# Patient Record
Sex: MALE | Race: WHITE | NOT HISPANIC OR LATINO | Employment: OTHER | URBAN - METROPOLITAN AREA
[De-identification: names, ages, dates, MRNs, and addresses within clinical notes are randomized per-mention and may not be internally consistent; named-entity substitution may affect disease eponyms.]

---

## 2017-07-19 NOTE — PATIENT DISCUSSION
OLD RETINAL DETACHMENT OS: STABLE. FOLLOW UP. PATIENT TO RETURN TO DR Stanislaw Douglass AS SCHEDULED FOR FURTHER EVAL AND TREATMENT.

## 2018-08-25 NOTE — PATIENT DISCUSSION
EPIRETINAL MEMBRANE OD: ERM NOT VISUALLY SIGNIFICANT TO THE PATIENT AT THIS TIME. WILL CONTINUE TO FOLLOW WITH EXAMS AND OCT'S AS SCHEDULED.
General:
OLD RD OS: LOOKS STABLE. FOLLOW UP AS SCHEDULED.
POSTERIOR VITREOUS DETACHMENT OD: NO HOLES, TEARS, OR DETACHMENTS TODAY. ADVISED PT TO CALL IF ANY FLASHES OF LIGHT, INCREASE IN FLOATERS, OR DECREASE VISION IN EITHER EYE.
English

## 2019-06-18 ENCOUNTER — CATARACT CONSULT (OUTPATIENT)
Dept: URBAN - METROPOLITAN AREA CLINIC 43 | Facility: CLINIC | Age: 84
End: 2019-06-18

## 2019-06-18 DIAGNOSIS — H18.51: ICD-10-CM

## 2019-06-18 DIAGNOSIS — H25.811: ICD-10-CM

## 2019-06-18 DIAGNOSIS — H35.373: ICD-10-CM

## 2019-06-18 DIAGNOSIS — H25.812: ICD-10-CM

## 2019-06-18 PROCEDURE — 92004 COMPRE OPH EXAM NEW PT 1/>: CPT

## 2019-06-18 PROCEDURE — 92015 DETERMINE REFRACTIVE STATE: CPT

## 2019-06-18 PROCEDURE — 92134 CPTRZ OPH DX IMG PST SGM RTA: CPT

## 2019-06-18 ASSESSMENT — VISUAL ACUITY
OS_CC: J12
OD_CC: J1
OD_BAT: <20/400
OD_SC: 20/100-1
OS_SC: 20/300
OD_RAM: 20/20
OS_CC: 20/200
OS_SC: <J12
OD_SC: <J12
OS_BAT: <20/400
OS_AM: 20/100+1
OD_CC: 20/25+2

## 2019-06-18 ASSESSMENT — TONOMETRY
OD_IOP_MMHG: 14
OS_IOP_MMHG: 14

## 2019-09-25 ENCOUNTER — FOLLOW UP (OUTPATIENT)
Dept: URBAN - METROPOLITAN AREA CLINIC 43 | Facility: CLINIC | Age: 84
End: 2019-09-25

## 2019-09-25 DIAGNOSIS — H18.51: ICD-10-CM

## 2019-09-25 DIAGNOSIS — H25.811: ICD-10-CM

## 2019-09-25 DIAGNOSIS — H35.3130: ICD-10-CM

## 2019-09-25 DIAGNOSIS — H53.2: ICD-10-CM

## 2019-09-25 DIAGNOSIS — E11.3292: ICD-10-CM

## 2019-09-25 DIAGNOSIS — Z98.890: ICD-10-CM

## 2019-09-25 DIAGNOSIS — H35.373: ICD-10-CM

## 2019-09-25 DIAGNOSIS — H25.812: ICD-10-CM

## 2019-09-25 DIAGNOSIS — H40.1130: ICD-10-CM

## 2019-09-25 PROCEDURE — 92134 CPTRZ OPH DX IMG PST SGM RTA: CPT

## 2019-09-25 PROCEDURE — 92012 INTRM OPH EXAM EST PATIENT: CPT

## 2019-09-25 ASSESSMENT — VISUAL ACUITY
OD_RAM: 20/20
OD_SC: 20/100
OS_AM: 20/60
OD_SC: J12
OD_BAT: 20/100
OS_SC: J12
OD_CC: 20/40+2
OS_SC: 20/400
OD_CC: J3
OS_BAT: 20/400

## 2019-09-25 ASSESSMENT — TONOMETRY
OD_IOP_MMHG: 14
OS_IOP_MMHG: 14

## 2019-09-26 NOTE — PATIENT DISCUSSION
POSTERIOR VITREOUS DETACHMENT OD: NO HOLES, TEARS, OR DETACHMENTS TODAY. ADVISED PT TO CALL IF ANY FLASHES OF LIGHT, INCREASE IN FLOATERS, OR DECREASE VISION IN EITHER EYE.

## 2019-10-07 ENCOUNTER — PRE-OP/H&P (OUTPATIENT)
Dept: URBAN - METROPOLITAN AREA CLINIC 39 | Facility: CLINIC | Age: 84
End: 2019-10-07

## 2019-10-07 ENCOUNTER — SURGERY/PROCEDURE (OUTPATIENT)
Dept: URBAN - METROPOLITAN AREA SURGERY 14 | Facility: SURGERY | Age: 84
End: 2019-10-07

## 2019-10-07 ENCOUNTER — POST-OP CATARACT (OUTPATIENT)
Dept: URBAN - METROPOLITAN AREA CLINIC 39 | Facility: CLINIC | Age: 84
End: 2019-10-07

## 2019-10-07 DIAGNOSIS — Z96.1: ICD-10-CM

## 2019-10-07 DIAGNOSIS — H25.812: ICD-10-CM

## 2019-10-07 PROCEDURE — 99211T TECH SERVICE

## 2019-10-07 PROCEDURE — 6698454 REMOVE CATARACT;INSERT LENS (SX ONLY)

## 2019-10-07 ASSESSMENT — TONOMETRY: OS_IOP_MMHG: 14

## 2019-10-07 ASSESSMENT — VISUAL ACUITY: OS_SC: 20/400

## 2020-10-14 ENCOUNTER — PREPPED CHART (OUTPATIENT)
Dept: URBAN - METROPOLITAN AREA CLINIC 30 | Facility: CLINIC | Age: 85
End: 2020-10-14

## 2021-02-08 ASSESSMENT — TONOMETRY
OD_IOP_MMHG: 6
OS_IOP_MMHG: 7

## 2021-02-09 ENCOUNTER — ESTABLISHED COMPREHENSIVE EXAM (OUTPATIENT)
Dept: URBAN - METROPOLITAN AREA CLINIC 30 | Facility: CLINIC | Age: 86
End: 2021-02-09

## 2021-02-09 DIAGNOSIS — E11.9: ICD-10-CM

## 2021-02-09 DIAGNOSIS — H40.1131: ICD-10-CM

## 2021-02-09 DIAGNOSIS — H35.3131: ICD-10-CM

## 2021-02-09 PROCEDURE — 92020 GONIOSCOPY: CPT

## 2021-02-09 PROCEDURE — 92133 CPTRZD OPH DX IMG PST SGM ON: CPT

## 2021-02-09 PROCEDURE — 99213 OFFICE O/P EST LOW 20 MIN: CPT

## 2021-02-09 ASSESSMENT — TONOMETRY
OS_IOP_MMHG: 7
OD_IOP_MMHG: 9

## 2021-09-07 ENCOUNTER — ESTABLISHED COMPREHENSIVE EXAM (OUTPATIENT)
Dept: URBAN - METROPOLITAN AREA CLINIC 30 | Facility: CLINIC | Age: 86
End: 2021-09-07

## 2021-09-07 DIAGNOSIS — H40.1131: ICD-10-CM

## 2021-09-07 DIAGNOSIS — H35.3131: ICD-10-CM

## 2021-09-07 DIAGNOSIS — E11.9: ICD-10-CM

## 2021-09-07 PROCEDURE — 92250 FUNDUS PHOTOGRAPHY W/I&R: CPT

## 2021-09-07 PROCEDURE — 99214 OFFICE O/P EST MOD 30 MIN: CPT

## 2021-09-07 PROCEDURE — 92083 EXTENDED VISUAL FIELD XM: CPT

## 2021-09-07 ASSESSMENT — TONOMETRY
OD_IOP_MMHG: 12
OS_IOP_MMHG: 9

## 2021-09-07 ASSESSMENT — VISUAL ACUITY
OS_CC: 20/400
OU_CC: 20/20-2
OD_CC: 20/20-2

## 2022-01-06 ENCOUNTER — COMPREHENSIVE EXAM (OUTPATIENT)
Dept: URBAN - METROPOLITAN AREA CLINIC 30 | Facility: CLINIC | Age: 87
End: 2022-01-06

## 2022-01-06 DIAGNOSIS — H47.20: ICD-10-CM

## 2022-01-06 DIAGNOSIS — H35.3131: ICD-10-CM

## 2022-01-06 DIAGNOSIS — H31.002: ICD-10-CM

## 2022-01-06 DIAGNOSIS — H40.1131: ICD-10-CM

## 2022-01-06 DIAGNOSIS — E11.9: ICD-10-CM

## 2022-01-06 PROCEDURE — 0509T PATTERN ERG W/I&R: CPT

## 2022-01-06 PROCEDURE — 99214 OFFICE O/P EST MOD 30 MIN: CPT

## 2022-01-06 PROCEDURE — 92133 CPTRZD OPH DX IMG PST SGM ON: CPT

## 2022-01-06 PROCEDURE — 92015 DETERMINE REFRACTIVE STATE: CPT

## 2022-01-06 ASSESSMENT — TONOMETRY
OS_IOP_MMHG: 7
OD_IOP_MMHG: 10

## 2022-01-06 ASSESSMENT — VISUAL ACUITY
OD_CC: 20/25-1
OS_CC: CF 6FT

## 2022-05-03 ENCOUNTER — FOLLOW UP (OUTPATIENT)
Dept: URBAN - METROPOLITAN AREA CLINIC 30 | Facility: CLINIC | Age: 87
End: 2022-05-03

## 2022-05-03 DIAGNOSIS — H40.1131: ICD-10-CM

## 2022-05-03 DIAGNOSIS — H47.20: ICD-10-CM

## 2022-05-03 PROCEDURE — 92020 GONIOSCOPY: CPT

## 2022-05-03 PROCEDURE — 99213 OFFICE O/P EST LOW 20 MIN: CPT

## 2022-05-03 ASSESSMENT — VISUAL ACUITY
OS_SC: 20/400
OU_SC: 20/25+1
OD_SC: 20/25+1

## 2022-05-03 ASSESSMENT — TONOMETRY
OS_IOP_MMHG: 10
OD_IOP_MMHG: 11
OD_IOP_MMHG: 12
OS_IOP_MMHG: 9

## 2022-09-06 ENCOUNTER — COMPREHENSIVE EXAM (OUTPATIENT)
Dept: URBAN - METROPOLITAN AREA CLINIC 30 | Facility: CLINIC | Age: 87
End: 2022-09-06

## 2022-09-06 DIAGNOSIS — H25.11: ICD-10-CM

## 2022-09-06 DIAGNOSIS — H47.20: ICD-10-CM

## 2022-09-06 DIAGNOSIS — H40.1131: ICD-10-CM

## 2022-09-06 DIAGNOSIS — H35.09: ICD-10-CM

## 2022-09-06 PROCEDURE — 99214 OFFICE O/P EST MOD 30 MIN: CPT

## 2022-09-06 PROCEDURE — 92083 EXTENDED VISUAL FIELD XM: CPT

## 2022-09-06 PROCEDURE — 92250 FUNDUS PHOTOGRAPHY W/I&R: CPT

## 2022-09-06 ASSESSMENT — VISUAL ACUITY
OD_CC: 20/20
OD_CC: J1
OS_CC: 20/200
OS_CC: J16

## 2022-09-06 ASSESSMENT — TONOMETRY
OS_IOP_MMHG: 7
OD_IOP_MMHG: 10

## 2023-08-15 ENCOUNTER — COMPREHENSIVE EXAM (OUTPATIENT)
Facility: LOCATION | Age: 88
End: 2023-08-15

## 2023-08-15 DIAGNOSIS — H40.1131: ICD-10-CM

## 2023-08-15 DIAGNOSIS — H25.11: ICD-10-CM

## 2023-08-15 DIAGNOSIS — H35.3131: ICD-10-CM

## 2023-08-15 DIAGNOSIS — H31.002: ICD-10-CM

## 2023-08-15 DIAGNOSIS — H35.373: ICD-10-CM

## 2023-08-15 DIAGNOSIS — E11.9: ICD-10-CM

## 2023-08-15 DIAGNOSIS — H35.09: ICD-10-CM

## 2023-08-15 DIAGNOSIS — H43.393: ICD-10-CM

## 2023-08-15 DIAGNOSIS — H02.886: ICD-10-CM

## 2023-08-15 DIAGNOSIS — H02.883: ICD-10-CM

## 2023-08-15 DIAGNOSIS — H47.20: ICD-10-CM

## 2023-08-15 PROCEDURE — 92250 FUNDUS PHOTOGRAPHY W/I&R: CPT

## 2023-08-15 PROCEDURE — 92014 COMPRE OPH EXAM EST PT 1/>: CPT

## 2023-08-15 PROCEDURE — 92273 FULL FIELD ERG W/I&R: CPT

## 2023-08-15 ASSESSMENT — VISUAL ACUITY
OS_CC: J16
OD_CC: J3
OD_CC: 20/20
OS_PH: 20/100
OS_CC: 20/200

## 2023-08-15 ASSESSMENT — KERATOMETRY
OD_K1POWER_DIOPTERS: 45.25
OS_AXISANGLE_DEGREES: 170
OS_K1POWER_DIOPTERS: 45.00
OS_AXISANGLE2_DEGREES: 80
OD_AXISANGLE_DEGREES: 15
OS_K2POWER_DIOPTERS: 44.50
OD_AXISANGLE2_DEGREES: 105
OD_K2POWER_DIOPTERS: 44.25

## 2023-08-15 ASSESSMENT — TONOMETRY
OS_IOP_MMHG: 10
OD_IOP_MMHG: 13

## 2023-12-11 ASSESSMENT — KERATOMETRY
OD_AXISANGLE_DEGREES: 15
OD_K2POWER_DIOPTERS: 44.25
OS_AXISANGLE2_DEGREES: 80
OS_AXISANGLE_DEGREES: 170
OS_K1POWER_DIOPTERS: 45.00
OS_K2POWER_DIOPTERS: 44.50
OD_AXISANGLE2_DEGREES: 105
OD_K1POWER_DIOPTERS: 45.25

## 2023-12-12 ENCOUNTER — ESTABLISHED PATIENT (OUTPATIENT)
Facility: LOCATION | Age: 88
End: 2023-12-12

## 2023-12-12 DIAGNOSIS — H25.11: ICD-10-CM

## 2023-12-12 DIAGNOSIS — H02.883: ICD-10-CM

## 2023-12-12 DIAGNOSIS — H47.20: ICD-10-CM

## 2023-12-12 DIAGNOSIS — H02.886: ICD-10-CM

## 2023-12-12 DIAGNOSIS — H40.1131: ICD-10-CM

## 2023-12-12 PROCEDURE — 92133 CPTRZD OPH DX IMG PST SGM ON: CPT

## 2023-12-12 PROCEDURE — 99213 OFFICE O/P EST LOW 20 MIN: CPT

## 2023-12-12 PROCEDURE — 92273 FULL FIELD ERG W/I&R: CPT

## 2023-12-12 ASSESSMENT — VISUAL ACUITY
OD_CC: 20/25-2
OS_PH: 20/100
OS_CC: 20/400

## 2023-12-12 ASSESSMENT — TONOMETRY
OS_IOP_MMHG: 8
OD_IOP_MMHG: 7

## 2024-06-17 ASSESSMENT — KERATOMETRY
OD_K2POWER_DIOPTERS: 44.25
OS_AXISANGLE2_DEGREES: 80
OD_K1POWER_DIOPTERS: 45.25
OS_AXISANGLE_DEGREES: 170
OD_AXISANGLE2_DEGREES: 105
OS_K1POWER_DIOPTERS: 45.00
OS_K2POWER_DIOPTERS: 44.50
OD_AXISANGLE_DEGREES: 15

## 2024-06-19 ENCOUNTER — PREPPED CHART (OUTPATIENT)
Facility: LOCATION | Age: 89
End: 2024-06-19

## 2024-07-24 ENCOUNTER — COMPREHENSIVE EXAM (OUTPATIENT)
Facility: LOCATION | Age: 89
End: 2024-07-24

## 2024-07-24 DIAGNOSIS — H35.3131: ICD-10-CM

## 2024-07-24 DIAGNOSIS — H02.886: ICD-10-CM

## 2024-07-24 DIAGNOSIS — H35.373: ICD-10-CM

## 2024-07-24 DIAGNOSIS — H02.883: ICD-10-CM

## 2024-07-24 DIAGNOSIS — H47.20: ICD-10-CM

## 2024-07-24 DIAGNOSIS — H53.8: ICD-10-CM

## 2024-07-24 DIAGNOSIS — H43.393: ICD-10-CM

## 2024-07-24 DIAGNOSIS — H25.11: ICD-10-CM

## 2024-07-24 DIAGNOSIS — H35.09: ICD-10-CM

## 2024-07-24 DIAGNOSIS — H40.1131: ICD-10-CM

## 2024-07-24 PROCEDURE — 92015 DETERMINE REFRACTIVE STATE: CPT

## 2024-07-24 PROCEDURE — 92083 EXTENDED VISUAL FIELD XM: CPT

## 2024-07-24 PROCEDURE — 92014 COMPRE OPH EXAM EST PT 1/>: CPT

## 2024-07-24 ASSESSMENT — TONOMETRY
OS_IOP_MMHG: 11
OD_IOP_MMHG: 10

## 2024-07-24 ASSESSMENT — VISUAL ACUITY
OS_CC: 20/400
OS_CC: CF 6FT
OD_CC: J3
OD_CC: 20/40
OD_PH: 20/30

## 2024-12-11 ENCOUNTER — FOLLOW UP (OUTPATIENT)
Age: 89
End: 2024-12-11

## 2024-12-11 DIAGNOSIS — H47.20: ICD-10-CM

## 2024-12-11 DIAGNOSIS — H02.886: ICD-10-CM

## 2024-12-11 DIAGNOSIS — H40.1131: ICD-10-CM

## 2024-12-11 DIAGNOSIS — H25.11: ICD-10-CM

## 2024-12-11 DIAGNOSIS — H02.883: ICD-10-CM

## 2024-12-11 PROCEDURE — 92273 FULL FIELD ERG W/I&R: CPT

## 2024-12-11 PROCEDURE — 99213 OFFICE O/P EST LOW 20 MIN: CPT

## 2024-12-11 PROCEDURE — 92250 FUNDUS PHOTOGRAPHY W/I&R: CPT
